# Patient Record
Sex: MALE | Race: WHITE | NOT HISPANIC OR LATINO | Employment: FULL TIME | URBAN - METROPOLITAN AREA
[De-identification: names, ages, dates, MRNs, and addresses within clinical notes are randomized per-mention and may not be internally consistent; named-entity substitution may affect disease eponyms.]

---

## 2023-07-26 LAB
ALANINE AMINOTRANSFERASE (SGPT) (U/L) IN SER/PLAS: 35 U/L (ref 10–52)
ALBUMIN (G/DL) IN SER/PLAS: 4.5 G/DL (ref 3.4–5)
ALKALINE PHOSPHATASE (U/L) IN SER/PLAS: 55 U/L (ref 33–120)
ANION GAP IN SER/PLAS: 9 MMOL/L (ref 10–20)
ASPARTATE AMINOTRANSFERASE (SGOT) (U/L) IN SER/PLAS: 49 U/L (ref 9–39)
BILIRUBIN TOTAL (MG/DL) IN SER/PLAS: 0.7 MG/DL (ref 0–1.2)
C REACTIVE PROTEIN (MG/L) IN SER/PLAS: 0.28 MG/DL
CALCIUM (MG/DL) IN SER/PLAS: 9.1 MG/DL (ref 8.6–10.3)
CARBON DIOXIDE, TOTAL (MMOL/L) IN SER/PLAS: 29 MMOL/L (ref 21–32)
CHLORIDE (MMOL/L) IN SER/PLAS: 105 MMOL/L (ref 98–107)
CREATINE KINASE (U/L) IN SER/PLAS: 313 U/L (ref 0–325)
CREATININE (MG/DL) IN SER/PLAS: 1.37 MG/DL (ref 0.5–1.3)
ERYTHROCYTE DISTRIBUTION WIDTH (RATIO) BY AUTOMATED COUNT: 12.1 % (ref 11.5–14.5)
ERYTHROCYTE MEAN CORPUSCULAR HEMOGLOBIN CONCENTRATION (G/DL) BY AUTOMATED: 34.1 G/DL (ref 32–36)
ERYTHROCYTE MEAN CORPUSCULAR VOLUME (FL) BY AUTOMATED COUNT: 89 FL (ref 80–100)
ERYTHROCYTES (10*6/UL) IN BLOOD BY AUTOMATED COUNT: 5 X10E12/L (ref 4.5–5.9)
GFR MALE: 69 ML/MIN/1.73M2
GLUCOSE (MG/DL) IN SER/PLAS: 78 MG/DL (ref 74–99)
HEMATOCRIT (%) IN BLOOD BY AUTOMATED COUNT: 44.6 % (ref 41–52)
HEMOGLOBIN (G/DL) IN BLOOD: 15.2 G/DL (ref 13.5–17.5)
LEUKOCYTES (10*3/UL) IN BLOOD BY AUTOMATED COUNT: 5.9 X10E9/L (ref 4.4–11.3)
PLATELETS (10*3/UL) IN BLOOD AUTOMATED COUNT: 156 X10E9/L (ref 150–450)
POTASSIUM (MMOL/L) IN SER/PLAS: 3.9 MMOL/L (ref 3.5–5.3)
PROTEIN TOTAL: 7.2 G/DL (ref 6.4–8.2)
SEDIMENTATION RATE, ERYTHROCYTE: <1 MM/H (ref 0–15)
SODIUM (MMOL/L) IN SER/PLAS: 139 MMOL/L (ref 136–145)
TROPONIN I, HIGH SENSITIVITY: 3 NG/L (ref 0–20)
UREA NITROGEN (MG/DL) IN SER/PLAS: 22 MG/DL (ref 6–23)

## 2024-04-03 ENCOUNTER — OFFICE VISIT (OUTPATIENT)
Dept: ORTHOPEDIC SURGERY | Facility: CLINIC | Age: 34
End: 2024-04-03
Payer: COMMERCIAL

## 2024-04-03 ENCOUNTER — HOSPITAL ENCOUNTER (OUTPATIENT)
Dept: RADIOLOGY | Facility: CLINIC | Age: 34
Discharge: HOME | End: 2024-04-03
Payer: COMMERCIAL

## 2024-04-03 DIAGNOSIS — M25.522 LEFT ELBOW PAIN: ICD-10-CM

## 2024-04-03 DIAGNOSIS — M25.512 ACUTE PAIN OF LEFT SHOULDER: ICD-10-CM

## 2024-04-03 DIAGNOSIS — S93.402A MILD ANKLE SPRAIN, LEFT, INITIAL ENCOUNTER: ICD-10-CM

## 2024-04-03 DIAGNOSIS — Z87.828 HISTORY OF ANKLE SPRAIN: ICD-10-CM

## 2024-04-03 DIAGNOSIS — M25.572 ACUTE LEFT ANKLE PAIN: ICD-10-CM

## 2024-04-03 DIAGNOSIS — S90.00XA CONTUSION OF ANKLE, INITIAL ENCOUNTER: ICD-10-CM

## 2024-04-03 DIAGNOSIS — S52.125D NONDISPLACED FRACTURE OF HEAD OF LEFT RADIUS, SUBSEQUENT ENCOUNTER FOR CLOSED FRACTURE WITH ROUTINE HEALING: ICD-10-CM

## 2024-04-03 PROCEDURE — 73080 X-RAY EXAM OF ELBOW: CPT | Mod: LEFT SIDE | Performed by: FAMILY MEDICINE

## 2024-04-03 PROCEDURE — 1036F TOBACCO NON-USER: CPT | Performed by: FAMILY MEDICINE

## 2024-04-03 PROCEDURE — 73030 X-RAY EXAM OF SHOULDER: CPT | Mod: LT

## 2024-04-03 PROCEDURE — 99214 OFFICE O/P EST MOD 30 MIN: CPT | Mod: 57 | Performed by: FAMILY MEDICINE

## 2024-04-03 PROCEDURE — 24650 CLTX RDL HEAD/NCK FX WO MNPJ: CPT | Performed by: FAMILY MEDICINE

## 2024-04-03 PROCEDURE — 73610 X-RAY EXAM OF ANKLE: CPT | Mod: LT

## 2024-04-03 PROCEDURE — 73080 X-RAY EXAM OF ELBOW: CPT | Mod: LT

## 2024-04-03 PROCEDURE — 99204 OFFICE O/P NEW MOD 45 MIN: CPT | Performed by: FAMILY MEDICINE

## 2024-04-03 PROCEDURE — 73610 X-RAY EXAM OF ANKLE: CPT | Mod: LEFT SIDE | Performed by: FAMILY MEDICINE

## 2024-04-03 PROCEDURE — 73030 X-RAY EXAM OF SHOULDER: CPT | Mod: LEFT SIDE | Performed by: FAMILY MEDICINE

## 2024-04-03 RX ORDER — HYDROCODONE BITARTRATE AND ACETAMINOPHEN 5; 325 MG/1; MG/1
1 TABLET ORAL EVERY 12 HOURS PRN
Qty: 14 TABLET | Refills: 0 | Status: SHIPPED | OUTPATIENT
Start: 2024-04-03 | End: 2024-04-10

## 2024-04-03 NOTE — PROGRESS NOTES
Acute Injury New Patient Visit    CC:   Chief Complaint   Patient presents with    Left Ankle - Pain     Lt shoulder Lt ankle pain  Xrays today  Fellbackward playing basketball    Left Shoulder - Pain       HPI: Romulo is a 33 y.o.male who presents today with new complaints of pain discomfort to the left ankle shoulder and elbow.  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  He is a current  and .  He denies any additional injury or concern.  No significant history to the shoulder or elbow in the past he has had several ankle sprains prior.  Feels that the elbow is more painful than anything else today.        Review of Systems   GENERAL: Negative for malaise, significant weight loss, fever  MUSCULOSKELETAL: See HPI  NEURO: Negative for numbness / tingling     Past Medical History  History reviewed. No pertinent past medical history.    Medication review  Medication Documentation Review Audit       Reviewed by Cole C Budinsky, MD (Physician) on 04/03/24 at 1246      Medication Order Taking? Sig Documenting Provider Last Dose Status   HYDROcodone-acetaminophen (Norco) 5-325 mg tablet [id number]  Take 1 tablet by mouth every 12 hours if needed for severe pain (7 - 10) for up to 7 days. Cole C Budinsky, MD  Active                    Allergies  No Known Allergies    Social History  Social History     Socioeconomic History    Marital status:      Spouse name: Not on file    Number of children: Not on file    Years of education: Not on file    Highest education level: Not on file   Occupational History    Not on file   Tobacco Use    Smoking status: Never    Smokeless tobacco: Never   Substance and Sexual Activity    Alcohol use: Not on file    Drug use: Not on file    Sexual activity: Not on file   Other Topics Concern    Not on file   Social History Narrative    Not on file     Social Determinants of Health     Financial Resource Strain: Not on file   Food  Insecurity: Not on file   Transportation Needs: Not on file   Physical Activity: Not on file   Stress: Not on file   Social Connections: Not on file   Intimate Partner Violence: Not on file   Housing Stability: Not on file       Surgical History  History reviewed. No pertinent surgical history.    Physical Exam:  GENERAL:  Patient is awake, alert, and oriented to person place and time.  Patient appears well nourished and well kept.  Affect Calm, Not Acutely Distressed.  HEENT:  Normocephalic, Atraumatic, EOMI  CARDIOVASCULAR:  Hemodynamically stable.  RESPIRATORY:  Normal respirations with unlabored breathing.  NEURO: The bilateral upper and lower extremities were examined and demonstrated intact sensation medially and laterally.  Extremity: Left ankle exam demonstrates mild soft tissue swelling is tenderness palpation over the distal fibula ATFL and CFL ligament.  He has full plantarflexion dorsiflexion eversion inversion midfoot distal metatarsals nontender calf soft nontender no medial malleoli or pain negative heel tap calcaneal squeeze mild antalgic gait noted.  Left elbow demonstrates tenderness palpation over the lateral aspect of the forearm proximally at the level of the radial head he has limited pronation and supination secondary to pain he lacks terminal extension of the elbow by 5 to 10 degrees flexion is limited to 95 degrees.  Left shoulder has mild soft tissue tenderness with otherwise full range of motion and normal strength about the shoulder.  There is no crepitus or clicking.      Diagnostics: X-rays today positive for nondisplaced fracture of the left radial head        Procedure: None  Procedures    Assessment:   Problem List Items Addressed This Visit    None  Visit Diagnoses       Acute left ankle pain        Relevant Medications    HYDROcodone-acetaminophen (Norco) 5-325 mg tablet    Other Relevant Orders    XR ankle left 3+ views    XR shoulder left 2+ views    Sling    Acute pain of left  shoulder        Relevant Medications    HYDROcodone-acetaminophen (Norco) 5-325 mg tablet    Other Relevant Orders    XR ankle left 3+ views    XR shoulder left 2+ views    Sling    History of ankle sprain        Relevant Medications    HYDROcodone-acetaminophen (Norco) 5-325 mg tablet    Other Relevant Orders    Ankle Brace, Lace Up or A60    Sling    Contusion of ankle, initial encounter        Relevant Medications    HYDROcodone-acetaminophen (Norco) 5-325 mg tablet    Other Relevant Orders    Ankle Brace, Lace Up or A60    Sling    Left elbow pain        Relevant Medications    HYDROcodone-acetaminophen (Norco) 5-325 mg tablet    Other Relevant Orders    XR elbow left 3+ views    Sling    Nondisplaced fracture of head of left radius, subsequent encounter for closed fracture with routine healing        Relevant Medications    HYDROcodone-acetaminophen (Norco) 5-325 mg tablet    Other Relevant Orders    Sling    Mild ankle sprain, left, initial encounter        Relevant Medications    HYDROcodone-acetaminophen (Norco) 5-325 mg tablet    Other Relevant Orders    Sling             Plan: Discussed the nonoperative nature of these injuries with the patient here today.  He will be provided with a short course of pain medication for symptomatic relief in the evenings.  He was given a sling and recommended to come out of sling for gentle range of motion and strength recovery over the next week or 2.  Discussed the ability to provide him with a lace up ankle brace for the ankle sprain and to provide him with a short course of home exercises.  If the brace is not enough support he can call or return for a boot.  Regarding the left shoulder no obvious significant injury is appreciated on exam today.  Will keep a close eye on the shoulder.  Will repeat x-rays 3 views left elbow in 4 weeks for repeat evaluation if the ankle is still uncomfortable we will repeat x-rays as well there.    Orders Placed This Encounter    Ankle  Brace, Lace Up or A60    Sling    XR ankle left 3+ views    XR shoulder left 2+ views    XR elbow left 3+ views    HYDROcodone-acetaminophen (Norco) 5-325 mg tablet      At the conclusion of the visit there were no further questions by the patient/family regarding their plan of care.  Patient was instructed to call or return with any issues, questions, or concerns regarding their injury and/or treatment plan described above.     04/03/24 at 12:51 PM - Cole C Budinsky, MD    Office: [phone]    This note was prepared using voice recognition software.  The details of this note are correct and have been reviewed, and corrected to the best of my ability.  Some grammatical errors may persist related to the Dragon software.

## 2024-05-01 ENCOUNTER — APPOINTMENT (OUTPATIENT)
Dept: ORTHOPEDIC SURGERY | Facility: CLINIC | Age: 34
End: 2024-05-01
Payer: COMMERCIAL

## 2024-05-09 ENCOUNTER — HOSPITAL ENCOUNTER (OUTPATIENT)
Dept: RADIOLOGY | Facility: HOSPITAL | Age: 34
Discharge: HOME | End: 2024-05-09
Payer: COMMERCIAL

## 2024-05-09 ENCOUNTER — OFFICE VISIT (OUTPATIENT)
Dept: ORTHOPEDIC SURGERY | Facility: CLINIC | Age: 34
End: 2024-05-09
Payer: COMMERCIAL

## 2024-05-09 DIAGNOSIS — S52.125D NONDISPLACED FRACTURE OF HEAD OF LEFT RADIUS, SUBSEQUENT ENCOUNTER FOR CLOSED FRACTURE WITH ROUTINE HEALING: ICD-10-CM

## 2024-05-09 DIAGNOSIS — S93.402D SPRAIN OF LEFT ANKLE, UNSPECIFIED LIGAMENT, SUBSEQUENT ENCOUNTER: Primary | ICD-10-CM

## 2024-05-09 DIAGNOSIS — M25.572 ACUTE LEFT ANKLE PAIN: ICD-10-CM

## 2024-05-09 PROCEDURE — 73080 X-RAY EXAM OF ELBOW: CPT | Mod: LT

## 2024-05-09 PROCEDURE — 73610 X-RAY EXAM OF ANKLE: CPT | Mod: LEFT SIDE | Performed by: RADIOLOGY

## 2024-05-09 PROCEDURE — 73080 X-RAY EXAM OF ELBOW: CPT | Mod: LEFT SIDE | Performed by: RADIOLOGY

## 2024-05-09 PROCEDURE — 73610 X-RAY EXAM OF ANKLE: CPT | Mod: LT

## 2024-05-09 PROCEDURE — 1036F TOBACCO NON-USER: CPT | Performed by: FAMILY MEDICINE

## 2024-05-09 PROCEDURE — 99024 POSTOP FOLLOW-UP VISIT: CPT | Performed by: FAMILY MEDICINE

## 2024-05-09 NOTE — PROGRESS NOTES
Established Patient Follow-Up Visit    CC: No chief complaint on file.      HPI:  Romulo is a 33 y.o. male returns here today for follow-up visit regarding: Left ankle sprain, left radial head fracture.  He states only a little bit of swelling but no pain or discomfort to the left ankle.  He has a little bit of discomfort to the left elbow only when using full weightbearing to push off but seated position.  He has no pain or discomfort with normal range of motion activities otherwise.          REVIEW OF SYSTEMS:  GENERAL: Negative for malaise, significant weight loss, fever  MUSCULOSKELETAL: See HPI  NEURO: Negative for numbness / tingling       PHYSICAL EXAM:  -Neuro: The bilateral upper and lower extremities were examined and demonstrated intact sensation medially and laterally.  -Extremity: Left elbow exam demonstrates no tenderness palpation over the radial head no crepitus no flexion extension deficits.  Full range of motion and strength otherwise forearm compartment soft compressible.  Normal wrist flexion wrist extension strength as well.    Left ankle exam: On inspection minimal soft tissue swelling some fullness anteriorly mild discomfort over the ATFL and CFL ligament no bony tenderness to the medial or lateral malleolus negative heel tap calcaneal squeeze midfoot distal metatarsals nontender.  No laxity with anterior drawer.  Full plantarflexion dorsiflexion eversion inversion.  Patient is able to stand place full weightbearing walk around the room all without pain.    IMAGING: Imaging of the elbow and ankle both on the left side were interpreted and evaluated by me personally here today.  Repeat x-rays of the elbow today demonstrate stable nondisplaced left proximal radial neck fracture with interval sclerotic callus ration.  No presence for fracture dislocation to the left ankle  PROCEDURE: None  Procedures     ASSESSMENT:   Follow-up visit for:  Problem List Items Addressed This Visit    None  Visit  Diagnoses       Sprain of left ankle, unspecified ligament, subsequent encounter    -  Primary    Nondisplaced fracture of head of left radius, subsequent encounter for closed fracture with routine healing        Relevant Orders    XR elbow left 3+ views    Acute left ankle pain        Relevant Orders    XR ankle left 3+ views             PLAN: At this time we will offer the patient as needed follow-up.  With respect to the left elbow we will have him increase all activities as tolerated gently increasing weightbearing tolerance as able.  Should there be any issues or concerns he can call or return regarding his left elbow.  Additionally he may continue to wean from the lace up ankle brace only utilizing it with a high risk activities going forward for the next few weeks.  Regarding some of the soft tissue swelling which is not very painful for her and recommended compression socks or a simple compression ankle sleeve.  Also provided him with home exercises for range of motion strength and proprioceptive recovery.  Should there be any persistent issues or recurrent injury to the left ankle I am happy to see him back down the road.  Patient denied any further additional issues or concerns and was agreeable to the plan as above.  Orders Placed This Encounter    XR elbow left 3+ views    XR ankle left 3+ views           At the conclusion of the visit there were no further questions by the patient/family regarding their plan of care.  Patient was instructed to call or return with any issues, questions, or concerns regarding their injury and/or treatment plan described above.     05/09/24 at 4:07 PM - Cole C Budinsky, MD    Office: [phone]    This note was prepared using voice recognition software.  The details of this note are correct and have been reviewed, and corrected to the best of my ability.  Some grammatical errors may persist related to the Dragon software.

## 2024-08-14 ENCOUNTER — OFFICE VISIT (OUTPATIENT)
Dept: ORTHOPEDIC SURGERY | Facility: CLINIC | Age: 34
End: 2024-08-14
Payer: COMMERCIAL

## 2024-08-14 ENCOUNTER — HOSPITAL ENCOUNTER (OUTPATIENT)
Dept: RADIOLOGY | Facility: HOSPITAL | Age: 34
Discharge: HOME | End: 2024-08-14
Payer: COMMERCIAL

## 2024-08-14 DIAGNOSIS — M54.50 ACUTE LOW BACK PAIN, UNSPECIFIED BACK PAIN LATERALITY, UNSPECIFIED WHETHER SCIATICA PRESENT: ICD-10-CM

## 2024-08-14 DIAGNOSIS — S39.012A LUMBAR STRAIN, INITIAL ENCOUNTER: Primary | ICD-10-CM

## 2024-08-14 DIAGNOSIS — M67.952 TENDINOPATHY OF LEFT GLUTEUS MEDIUS: ICD-10-CM

## 2024-08-14 PROCEDURE — 72110 X-RAY EXAM L-2 SPINE 4/>VWS: CPT

## 2024-08-14 PROCEDURE — 99214 OFFICE O/P EST MOD 30 MIN: CPT | Performed by: STUDENT IN AN ORGANIZED HEALTH CARE EDUCATION/TRAINING PROGRAM

## 2024-08-14 RX ORDER — CYCLOBENZAPRINE HCL 10 MG
10 TABLET ORAL NIGHTLY PRN
Qty: 7 TABLET | Refills: 0 | Status: SHIPPED | OUTPATIENT
Start: 2024-08-14 | End: 2024-08-21

## 2024-08-14 RX ORDER — METHYLPREDNISOLONE 4 MG/1
TABLET ORAL
Qty: 1 EACH | Refills: 0 | Status: SHIPPED | OUTPATIENT
Start: 2024-08-14

## 2024-08-14 NOTE — PROGRESS NOTES
Acute Injury Established Patient Visit    HPI: Romulo is a 33 y.o.male who presents today with new complaints of low back pain.  He states that on Saturday he was trying to lift the garage door as the spring was broken, and he states that he felt immediate pain into his left lower back.  He states that it radiates to the lateral hip.  He denies any numbness and tingling.  He denies any bowel bladder changes, saddle anesthesia, no weakness in the legs.  States that the pain is throbbing and sharp.  He is having worsening pain with bending forward and sitting for long periods of time.  Has been using ibuprofen, muscle relaxer, ice, and saw the chiropractor 4 times.  He is not getting better.    Plan: For this left lumbar strain and left gluteus medius tendinopathy, we will start a Medrol Dosepak, him on cyclobenzaprine, have him start physical therapy and home exercises, continue other conservative treatment measures as discussed including ice, heat, stretches, and rest, and follow-up in 5 to 6 weeks.    Assessment:   Problem List Items Addressed This Visit    None  Visit Diagnoses       Lumbar strain, initial encounter    -  Primary    Acute low back pain, unspecified back pain laterality, unspecified whether sciatica present        Relevant Orders    XR lumbar spine complete 4+ views    Tendinopathy of left gluteus medius                Diagnostics: X-rays of the lumbar spine reviewed and reveal no obvious acute fracture or subluxation.      Procedure:  Procedures    Physical Exam:  GENERAL:  No obvious acute distress.  NEURO:  Distally neurovascularly intact.  Sensation intact to light touch.  Extremity: Exam of the lumbar spine:  No tenderness along the midline of the lumbar and sacral spine;  No step-offs along the midline of the lumbar and sacral spine;  Flexion is limited with pain;  Extension is limited with limited with pain;  Sidebending is limited with pain;  Rotation is limited with pain;  POSITIVE  straight leg raise on the right, localizing to the low back on the right;  Negative straight leg raise on the left;  Full strength and range of motion throughout the bilateral lower extremities; and  Moderately antalgic gait.    Orders Placed This Encounter    XR lumbar spine complete 4+ views      At the conclusion of the visit there were no further questions by the patient/family regarding their plan of care.  Patient was instructed to call or return with any issues, questions, or concerns regarding their injury and/or treatment plan described above.     08/14/24 at 4:01 PM - Abdelrahman Aguilera,     Office: [phone]    This note was prepared using voice recognition software.  The details of this note are correct and have been reviewed, and corrected to the best of my ability.  Some grammatical errors may persist related to the Dragon software.

## 2024-08-27 NOTE — PROGRESS NOTES
"Physical Therapy    Physical Therapy Evaluation and Treatment      Patient Name: Romulo Sapp  MRN: [id number]  Today's Date: 8/28/2024    Current Problem:   1. Lumbar spine strain, subsequent encounter  Referral to Physical Therapy      2. Lumbago  Referral to Physical Therapy        Insurance: M[employer]  Allowed visits: 10    Subjective  HPI: Patient has a manual garage door on his shed that jammed when his son jumped on it. When he attempted to open the door \"my arm went up and my back did not\" and the garage did not open. This occurred 3 weeks ago. He was prescribed a steroid taper and a muscle relaxer which provided some relief but he feels as though pain has worsened again. He has tried use of ice and Ibuprofen with minimal relief. He was prescribed light stretches by Dr. Aguilera for his hamstring and his piriformis which help but also sometimes cause pain even when he performs them contralaterally. Chief complaint currently is \"sleeping at night\". He has pain with lifting and \"I can't do much with physical activity\". He has had back pain that resolved in the past and was beginning to be more active with his wife doing yoga prior to recent injury. Biggest limitation currently is \"I can't even mow my lawn\" as he has pain with walking/pushing his mower. He has some relief with sitting down but then notes \"it feels better to walk around too\". Exacerbating factors include \"walking for too long, sitting for too long\". He generally sleeps on his side and prefers his left side which is uncomfortable at this time. All of his pain is left sided from his low back into his buttocks. He denies paresthesias into either LE. He denies an increase in symptoms with Valsalva. Relieving factors include \"light stretching\". Stretching on the right sometimes causes pain on his left. He denies any change in bowel or bladder habits, denies recent weight loss. He was 100% functional prior to injury. PMH is positive for LBP, bilateral " "compartment syndrome and fasciotomies.   Referring physician: Abdelrahman Aguilera DO - F/U 9/27/24  PCP: Sampson Sandhu DO    Living environment: multi-story home; 15 steps. No BON. Lives with wife and children.   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     Patient-specific goal: \"Basically just to strengthen everything back up\".     Objective  Worst pain in the last 24 hours, 6/10    Precautions: none    Relevant imaging/diagnostic testing results: x-rays of lumbar spine unremarkable. 12 mm calcific density projecting in the right hemiabdomen in approximate anatomic location of the right kidney which may represent   intra-abdominal calcification or intraluminal calcification within bowel with nephrolith not excluded.    Observation: increased lumbar lordosis evident.     Gait Assessment: ambulated into clinic with somewhat guarded posture; slight difficulty with sit to stand, slight Sukh's sign.     AROM  Lumbar flexion: decreased 50%; minimal reversal of lordosis  RFIS: no change in pain with 10 reps. DKTC \"tight\"  Lumbar extension: decreased 50%; hinges from L4-5 P!   GLORIA: worse pain with 10 reps  Lumbar sidebending right: decreased 25%    Sidebending left: decreased 50%; minimal movement at low lumbar P!  Lumbar rotation right: decreased 25%    Rotation left: decreased 50%   Hip flexion right: WNL    Hip flexion left: WNL P!   Hip extension right: slightly limited    Hip extension left: slightly limited  Hip ER supine right: WNL    Hip ER supine left: WNL P!  Hip IR supine right: WNL    Hip IR supine left: WNL    MMT:  (L3-L4) Right quadriceps: 5    Left quadriceps: 5 P!   (L1-L2) Right iliopsoas: 5    Left iliopsoas: 5  (S2) Right hip abductors supine: fair    Left hip abductors supine: fair  (L1-L2) Right hip adductors supine: good    Left hip adductors supine: good  (L5-S1) Right gluteus leelee: 5    Left gluteus leelee: 5  (S1-S2) Right hamstrings: 5    Left " "hamstrings: 5  Lower abdominals: unable to achieve test position P!   Upper abdominals: 5/10 P!     Flexibility:  Right hamstring: slightly limited   Left hamstring: limited  Right piriformis: TBA   Left piriformis: TBA  Right quadriceps: slightly limited    Left quadriceps: slightly limited    Mobility: decreased throughout lumbar spine; pain elicited with PA glides at left side of L3-L5.     Special Tests: slump positive  Crossed SLR negative    MARCIN: 58%    Assessment  34 yo male with 3 week history of present condition and presence of3 personal factors and/or comorbidities that impact the plan of care including PMH of LBP and bilateral compartment syndrome with fasciotomies presents with low back pain, decreased AROM, decreased strength, and decreased tolerance to IADLs consistent with lumbar sprain/strain effecting the following body structures and functions: low back body region and musculoskeletal body system including the lumbar spine. Activity limitations and participation restrictions include decreased tolerance to lifting, sleeping, and participating in wellness activities. Romulo will therefore benefit from PT management to establish a HEP and promote pain management and improved mobility. The clinical presentation of this patient is evolving and their history and examination findings are consistent with a moderate complexity evaluation. Good potential.    Treatment provided today: Initial evaluation completed. Discussed objective findings and goals of skilled care. Dry needling performed this date using 2 50 mm/2\" needles to L5 homeostatic points and 4 75 mm/3\" needles to superior cluneal and inferior gluteal homeostatic points. Informed consent obtained today and is on file in patient's chart. Cushing precautions and clean technique utilized throughout. Skin was inspected for any adverse reactions and none were present. Patient was educated to avoid NSAIDs and use of ice for the next 24 hours to " "allow for maximum healing potential. Instructed patient in therapeutic exercise and HEP with handout outlining specific parameters provided (LTR x20, TAC 5\"x10, DKTC 20\"x3). Agreed upon POC and answered all questions.    20130 - 20 minutes, 1 unit untimed  36250 - 15 minutes, 1 unit  79398 - 15 minutes, 1 unit    Plan  Recommending PT management 1x/week for 4 weeks, 4 visits.     Goals  Independent with HEP to expedite progress and promote goal achievement.   Decrease MARCIN score to < or equal to 20% for evidence of improved function.  Increase AROM lumbar spine flexion, extension, left SB, and left rotation by > or equal to 25% for improved ability to reach and bend.   Increase strength upper and lower abdominals to > or equal to 8/10 and bilateral hip abductors to 'good' for evidence of normal strength and tolerance to prolonged positions.  Decrease pain at worst with activity to < or equal to 2/10 for improved QOL and confidence in functional ability.     "

## 2024-08-28 ENCOUNTER — APPOINTMENT (OUTPATIENT)
Dept: PHYSICAL THERAPY | Facility: CLINIC | Age: 34
End: 2024-08-28
Payer: COMMERCIAL

## 2024-08-28 DIAGNOSIS — S39.012D LUMBAR SPINE STRAIN, SUBSEQUENT ENCOUNTER: Primary | ICD-10-CM

## 2024-08-28 DIAGNOSIS — M54.50 LUMBAGO: ICD-10-CM

## 2024-08-28 PROCEDURE — 97110 THERAPEUTIC EXERCISES: CPT | Performed by: PHYSICAL THERAPIST

## 2024-08-28 PROCEDURE — 97162 PT EVAL MOD COMPLEX 30 MIN: CPT | Performed by: PHYSICAL THERAPIST

## 2024-08-28 PROCEDURE — 97140 MANUAL THERAPY 1/> REGIONS: CPT | Performed by: PHYSICAL THERAPIST

## 2024-08-28 ASSESSMENT — PATIENT HEALTH QUESTIONNAIRE - PHQ9
1. LITTLE INTEREST OR PLEASURE IN DOING THINGS: NOT AT ALL
2. FEELING DOWN, DEPRESSED OR HOPELESS: NOT AT ALL
SUM OF ALL RESPONSES TO PHQ9 QUESTIONS 1 AND 2: 0

## 2024-08-28 ASSESSMENT — PAIN DESCRIPTION - DESCRIPTORS: DESCRIPTORS: TIGHTNESS;SHARP

## 2024-08-28 ASSESSMENT — PAIN SCALES - GENERAL: PAINLEVEL_OUTOF10: 6

## 2024-09-06 ENCOUNTER — APPOINTMENT (OUTPATIENT)
Dept: PRIMARY CARE | Facility: CLINIC | Age: 34
End: 2024-09-06
Payer: COMMERCIAL

## 2024-09-06 VITALS
SYSTOLIC BLOOD PRESSURE: 120 MMHG | WEIGHT: 228 LBS | HEIGHT: 75 IN | HEART RATE: 74 BPM | TEMPERATURE: 98.4 F | OXYGEN SATURATION: 96 % | DIASTOLIC BLOOD PRESSURE: 80 MMHG | BODY MASS INDEX: 28.35 KG/M2

## 2024-09-06 DIAGNOSIS — F90.9 ATTENTION DEFICIT HYPERACTIVITY DISORDER (ADHD), UNSPECIFIED ADHD TYPE: ICD-10-CM

## 2024-09-06 DIAGNOSIS — K21.9 GASTROESOPHAGEAL REFLUX DISEASE WITHOUT ESOPHAGITIS: ICD-10-CM

## 2024-09-06 DIAGNOSIS — Z76.89 ENCOUNTER TO ESTABLISH CARE: Primary | ICD-10-CM

## 2024-09-06 DIAGNOSIS — R93.5 ABNORMAL ABDOMINAL X-RAY: ICD-10-CM

## 2024-09-06 DIAGNOSIS — G47.00 INSOMNIA, UNSPECIFIED TYPE: ICD-10-CM

## 2024-09-06 RX ORDER — DEXTROAMPHETAMINE SACCHARATE, AMPHETAMINE ASPARTATE MONOHYDRATE, DEXTROAMPHETAMINE SULFATE AND AMPHETAMINE SULFATE 7.5; 7.5; 7.5; 7.5 MG/1; MG/1; MG/1; MG/1
30 CAPSULE, EXTENDED RELEASE ORAL
COMMUNITY
Start: 2024-09-03

## 2024-09-06 RX ORDER — DEXTROAMPHETAMINE SACCHARATE, AMPHETAMINE ASPARTATE, DEXTROAMPHETAMINE SULFATE AND AMPHETAMINE SULFATE 2.5; 2.5; 2.5; 2.5 MG/1; MG/1; MG/1; MG/1
10 TABLET ORAL DAILY
COMMUNITY
Start: 2024-07-08

## 2024-09-06 RX ORDER — EPINEPHRINE 0.3 MG/.3ML
1 INJECTION SUBCUTANEOUS ONCE AS NEEDED
COMMUNITY

## 2024-09-06 ASSESSMENT — ENCOUNTER SYMPTOMS
ARTHRALGIAS: 0
NERVOUS/ANXIOUS: 0
AGITATION: 0
HEADACHES: 0
DYSURIA: 0
MYALGIAS: 0
CONSTIPATION: 0
SORE THROAT: 0
PALPITATIONS: 0
ABDOMINAL PAIN: 0
NAUSEA: 0
WHEEZING: 0
HEMATURIA: 0
JOINT SWELLING: 0
FLANK PAIN: 0
DEPRESSION: 0
UNEXPECTED WEIGHT CHANGE: 0
DIARRHEA: 0
SHORTNESS OF BREATH: 0
LIGHT-HEADEDNESS: 0
FATIGUE: 0
FEVER: 0
COUGH: 0

## 2024-09-06 NOTE — PROGRESS NOTES
Subjective   Chief complaint: Romulo Sapp is a 34 y.o. male who presents for New Patient Visit (New patient establish PCP. ).    HPI:  HPI  To establish.  He was having back pain.  Was seen by orthopedics.  Performed x-ray.  Back x-ray revealed an abnormal 12 mm calcific object right hemiabdomen.  Will proceed with CT scan abdomen pelvis for evaluation.  No hematuria.  No dysuria.  No kidney stone flank pain.  No family history of kidney stones.  He was having gas or esophageal flux disease.  Underwent EGD.  Unremarkable.  Had a colonoscopy at that time.  They found a polyp.  Recommend a 5-year follow-up.  Otherwise he states he recently had labs through his work for employment physical.  He is can provide those results.  Previous primary care physician notes are reviewed.  He has been seen by rheumatology.  Positive positive NITZA but workup subsequent was unremarkable.  He was also evaluated by cardiology.  He does have a history of ADHD and insomnia.  He is followed by a provider.  For now physical examination is unremarkable.  Regular follow-up pending clinical course and test results  Past Medical History:   Diagnosis Date    ADHD (attention deficit hyperactivity disorder)     History reviewed. No pertinent surgical history.   No family history on file.  Social History     Socioeconomic History    Marital status:      Spouse name: Not on file    Number of children: Not on file    Years of education: Not on file    Highest education level: Not on file   Occupational History    Not on file   Tobacco Use    Smoking status: Never     Passive exposure: Never    Smokeless tobacco: Never   Vaping Use    Vaping status: Never Used   Substance and Sexual Activity    Alcohol use: Yes     Alcohol/week: 4.0 standard drinks of alcohol     Types: 4 Standard drinks or equivalent per week     Comment: 3-5 drinks weekly    Drug use: Never    Sexual activity: Not on file   Other Topics Concern    Not on file   Social  History Narrative    Not on file     Social Determinants of Health     Financial Resource Strain: High Risk (12/28/2022)    Received from [facility]    Overall Financial Resource Strain (CARDIA)     Difficulty of Paying Living Expenses: Hard   Food Insecurity: No Food Insecurity (12/28/2022)    Received from [facility]    Hunger Vital Sign     Worried About Running Out of Food in the Last Year: Never true     Ran Out of Food in the Last Year: Never true   Transportation Needs: No Transportation Needs (12/28/2022)    Received from [facility]    PRAPARE - Transportation     Lack of Transportation (Medical): No     Lack of Transportation (Non-Medical): No   Physical Activity: Insufficiently Active (12/28/2022)    Received from [facility]    Exercise Vital Sign     Days of Exercise per Week: 4 days     Minutes of Exercise per Session: 20 min   Stress: No Stress Concern Present (12/28/2022)    Received from [facility]    [place] [employer] - Occupational Stress Questionnaire     Feeling of Stress : Not at all   Social Connections: Socially Integrated (12/28/2022)    Received from [facility]    Social Connection and Isolation Panel [NHANES]     Frequency of Communication with Friends and Family: More than three times a week     Frequency of Social Gatherings with Friends and Family: Three times a week     Attends [employer] Services: More than 4 times per year     Active Member of Clubs or Organizations: Yes     Attends Club or Organization Meetings: 1 to 4 times per year     Marital Status:    Intimate Partner Violence: Not on file   Housing Stability: Low Risk  (12/28/2022)    Received from [facility]    Housing Stability Vital Sign     Unable to Pay for Housing in the Last Year: No     Number of Places Lived in the Last Year:  "1     Unstable Housing in the Last Year: No       Objective   /80 (BP Location: Left arm, Patient Position: Sitting, BP Cuff Size: Adult)   Pulse 74   Temp 36.9 °C (98.4 °F) (Temporal)   Ht 1.905 m (6' 3\")   Wt 103 kg (228 lb)   SpO2 96%   BMI 28.50 kg/m²   Physical Exam  Vitals and nursing note reviewed.   Constitutional:       General: He is not in acute distress.     Appearance: He is not ill-appearing or toxic-appearing.   HENT:      Head: Normocephalic and atraumatic.      Mouth/Throat:      Pharynx: No oropharyngeal exudate or posterior oropharyngeal erythema.   Eyes:      Extraocular Movements: Extraocular movements intact.      Conjunctiva/sclera: Conjunctivae normal.      Pupils: Pupils are equal, round, and reactive to light.   Cardiovascular:      Rate and Rhythm: Normal rate and regular rhythm.      Pulses: Normal pulses.      Heart sounds: Normal heart sounds. No murmur heard.  Pulmonary:      Effort: Pulmonary effort is normal.      Breath sounds: Normal breath sounds. No wheezing, rhonchi or rales.   Abdominal:      General: Abdomen is flat. Bowel sounds are normal. There is no distension.      Palpations: Abdomen is soft. There is no mass.      Tenderness: There is no abdominal tenderness.      Hernia: No hernia is present.   Musculoskeletal:         General: No swelling or deformity. Normal range of motion.      Cervical back: Normal range of motion and neck supple.   Skin:     General: Skin is warm and dry.      Capillary Refill: Capillary refill takes less than 2 seconds.      Coloration: Skin is not jaundiced.      Findings: No erythema or rash.   Neurological:      General: No focal deficit present.      Mental Status: He is oriented to person, place, and time. Mental status is at baseline.   Psychiatric:         Mood and Affect: Mood normal.         Behavior: Behavior normal.         Thought Content: Thought content normal.         Judgment: Judgment normal.       Review of Systems "   Constitutional:  Negative for fatigue, fever and unexpected weight change.   HENT:  Negative for congestion and sore throat.    Respiratory:  Negative for cough, shortness of breath and wheezing.    Cardiovascular:  Negative for chest pain, palpitations and leg swelling.   Gastrointestinal:  Negative for abdominal pain, constipation, diarrhea and nausea.   Genitourinary:  Negative for dysuria, flank pain and hematuria.   Musculoskeletal:  Negative for arthralgias, joint swelling and myalgias.   Skin:  Negative for rash.   Neurological:  Negative for syncope, light-headedness and headaches.   Psychiatric/Behavioral:  Negative for agitation. The patient is not nervous/anxious.       I have reviewed and reconciled the medication list with the patient today.   Current Outpatient Medications:     amphetamine-dextroamphetamine (Adderall) 10 mg tablet, Take 1 tablet (10 mg) by mouth once daily., Disp: , Rfl:     amphetamine-dextroamphetamine XR (Adderall XR) 30 mg 24 hr capsule, Take 1 capsule (30 mg) by mouth once daily in the morning. Take before meals., Disp: , Rfl:     cyclobenzaprine (Flexeril) 10 mg tablet, Take 1 tablet (10 mg) by mouth as needed at bedtime for muscle spasms for up to 7 days., Disp: 7 tablet, Rfl: 0    EPINEPHrine 0.3 mg/0.3 mL injection syringe, Inject 0.3 mL (0.3 mg) into the muscle 1 time if needed for anaphylaxis., Disp: , Rfl:     methylPREDNISolone (Medrol Dospak) 4 mg tablets, Follow schedule on package instructions, Disp: 1 each, Rfl: 0     Imaging:  XR lumbar spine complete 4+ views    Result Date: 8/17/2024  Interpreted By:  Antonina Pacheco, STUDY: Lumbar Spine, 4 views.   INDICATION: Signs/Symptoms:pain.   COMPARISON: None.   ACCESSION NUMBER(S): [id number]   ORDERING CLINICIAN: SENDY MELENDEZ   FINDINGS: Alignment is within normal limits. Disc heights are well preserved. 12 mm calcific density projecting in the right hemiabdomen in approximate anatomic location of the right kidney  which may represent intra-abdominal calcification or intraluminal calcification within bowel with nephrolith not excluded.   No dynamic instability on flexion/extension views. Vertebral body heights are preserved. Posterior elements are intact.       1. Unremarkable lumbar spine radiographs. 2. 12 mm calcific density projecting in the right hemiabdomen in approximate anatomic location of the right kidney which may represent intra-abdominal calcification or intraluminal calcification within bowel with nephrolith not excluded   MACRO: None.   Signed by: Antonina Pacheco 8/17/2024 12:01 PM Dictation workstation:   [id number]       Labs reviewed:    Lab Results   Component Value Date    WBC 5.9 07/26/2023    HGB 15.2 07/26/2023    HCT 44.6 07/26/2023     07/26/2023    ALT 35 07/26/2023    AST 49 (H) 07/26/2023     07/26/2023    K 3.9 07/26/2023     07/26/2023    CREATININE 1.37 (H) 07/26/2023    BUN 22 07/26/2023    CO2 29 07/26/2023       Assessment/Plan   Problem List Items Addressed This Visit    None  Visit Diagnoses         Codes    Encounter to establish care    -  Primary Z76.89    Gastroesophageal reflux disease without esophagitis     K21.9    Attention deficit hyperactivity disorder (ADHD), unspecified ADHD type     F90.9    Insomnia, unspecified type     G47.00    Abnormal abdominal x-ray     R93.5    Relevant Orders    CT abdomen pelvis w and wo IV contrast            Reinforced lifestyle modifications  Continue current medications as listed  Physical activity as tolerated and healthy diet encouraged  Maintain a healthy weight  Follow up in

## 2024-09-11 ENCOUNTER — APPOINTMENT (OUTPATIENT)
Dept: PHYSICAL THERAPY | Facility: CLINIC | Age: 34
End: 2024-09-11
Payer: COMMERCIAL

## 2024-09-16 ENCOUNTER — APPOINTMENT (OUTPATIENT)
Dept: PHYSICAL THERAPY | Facility: CLINIC | Age: 34
End: 2024-09-16
Payer: COMMERCIAL

## 2024-09-16 DIAGNOSIS — S39.012D LUMBAR SPINE STRAIN, SUBSEQUENT ENCOUNTER: Primary | ICD-10-CM

## 2024-09-16 DIAGNOSIS — M54.50 LUMBAGO: ICD-10-CM

## 2024-09-16 PROCEDURE — 97140 MANUAL THERAPY 1/> REGIONS: CPT | Performed by: PHYSICAL THERAPIST

## 2024-09-16 PROCEDURE — 97110 THERAPEUTIC EXERCISES: CPT | Performed by: PHYSICAL THERAPIST

## 2024-09-16 ASSESSMENT — PAIN SCALES - GENERAL: PAINLEVEL_OUTOF10: 6

## 2024-09-16 NOTE — PROGRESS NOTES
"Physical Therapy    Physical Therapy Treatment    Patient Name: Romulo Sapp  MRN: [id number]  Today's Date: 9/16/2024    Current Problem  1. Lumbar spine strain, subsequent encounter        2. Lumbago          General  Reason for Referral: (P) Low back pain  Referred By: RodneyP) Abdelrahman Aguilera DO    Insurance: MMO  Allowed visits: 10    Subjective  Patient with improvement in his back but has noticed \"I'm dragging my left foot behind me\" like his LE is weak. He denies paresthesias into his left LE. He is able to more around his home but notes \"I'm not as active as I'd like to be\". He describes pain as constant. He is sleeping better.     Objective  Reviewed and progressed marked therapeutic exercise per patient tolerance (33385 - 24 minutes, 2 units) *Child's pose 20\"x5  LTR x20  DKTC 20\"x3  TAC 5\"x15  *Hooklying clams BuTB 2x10  *SKTC isometrics 5\"x10 ea  *Seated lumbar flexion 20\"x3    *added to HEP    MT (51124 - 16 minutes, 1 units) Dry needling performed this date using 4 2\"/50 mm needles to L2 and L5 paraspinal points as well as 4 3\"/75 mm needles to inferior gluteal homeostatic points and superior cluneals; 8 needles used in total. Informed consent obtained at  and is on file in patient's chart. [place] precautions and clean technique utilized throughout. Skin was inspected for any adverse reactions and none were present. Patient was educated to avoid NSAIDs and use of ice for the next 24 hours to allow for maximum healing potential.   Grade III PA glides to L3-L5 for mobility and pain modulation; unilateral glides to right side of lumbar spine for left rotation mobility per patient tolerance.     Assessment  Patient tolerated workout well with improvement in lumbar rotation ROM evident. Seemed to tolerate position changes with increased ease. Difficulty setting right L5 needle with bony feel occurring each time; no resistance with left L5. Some decrease in pain reported following session. "     Plan  Continue per POC at this time.

## 2024-09-17 ENCOUNTER — HOSPITAL ENCOUNTER (OUTPATIENT)
Dept: RADIOLOGY | Facility: CLINIC | Age: 34
Discharge: HOME | End: 2024-09-17
Payer: COMMERCIAL

## 2024-09-17 DIAGNOSIS — R93.5 ABNORMAL ABDOMINAL X-RAY: ICD-10-CM

## 2024-09-17 PROCEDURE — 74176 CT ABD & PELVIS W/O CONTRAST: CPT | Performed by: RADIOLOGY

## 2024-09-17 PROCEDURE — 74176 CT ABD & PELVIS W/O CONTRAST: CPT

## 2024-09-23 ENCOUNTER — APPOINTMENT (OUTPATIENT)
Dept: PHYSICAL THERAPY | Facility: CLINIC | Age: 34
End: 2024-09-23
Payer: COMMERCIAL

## 2024-09-27 ENCOUNTER — APPOINTMENT (OUTPATIENT)
Dept: ORTHOPEDIC SURGERY | Facility: CLINIC | Age: 34
End: 2024-09-27
Payer: COMMERCIAL

## 2024-09-27 ENCOUNTER — APPOINTMENT (OUTPATIENT)
Dept: PHYSICAL THERAPY | Facility: CLINIC | Age: 34
End: 2024-09-27
Payer: COMMERCIAL

## 2024-09-27 DIAGNOSIS — S39.012A LUMBAR STRAIN, INITIAL ENCOUNTER: Primary | ICD-10-CM

## 2024-09-27 DIAGNOSIS — M67.952 TENDINOPATHY OF LEFT GLUTEUS MEDIUS: ICD-10-CM

## 2024-09-27 NOTE — PROGRESS NOTES
Established follow-up patient Visit    HPI: Romulo is a 34 y.o.male who presents today for follow-up of his left lumbar strain and left gluteus medius tendinopathy.  He states that overall he is feeling much better.  He states that over the past couple of weeks he feels like he is started to turn the corner.  He has been doing some physical therapy and home exercises.  He states that the Medrol Dosepak and cyclobenzaprine helped a little.  He denies any interval falls or injuries.  He does have some pain over the top of his right foot, that is most likely an overuse injury given his increased exercise.    On 8/14/2024, he presented with new complaints of low back pain.  He states that on Saturday he was trying to lift the garage door as the spring was broken, and he states that he felt immediate pain into his left lower back.  He states that it radiates to the lateral hip.  He denies any numbness and tingling.  He denies any bowel bladder changes, saddle anesthesia, no weakness in the legs.  States that the pain is throbbing and sharp.  He is having worsening pain with bending forward and sitting for long periods of time.  Has been using ibuprofen, muscle relaxer, ice, and saw the chiropractor 4 times.  He is not getting better.    Plan: Today, on 9/27/2024, we will have him continue with physical therapy and home exercises, continue to return return to his activities as tolerated, and follow-up as needed.  If he continues to have any issues with his right foot, I'd be happy to see him for that.    On 8/14/2024, for this left lumbar strain and left gluteus medius tendinopathy, we will start a Medrol Dosepak, him on cyclobenzaprine, have him start physical therapy and home exercises, continue other conservative treatment measures as discussed including ice, heat, stretches, and rest, and follow-up in 5 to 6 weeks.    Assessment:   Problem List Items Addressed This Visit    None  Visit Diagnoses       Lumbar strain,  initial encounter    -  Primary    Tendinopathy of left gluteus medius                  Diagnostics: X-rays of the lumbar spine reviewed and reveal no obvious acute fracture or subluxation.      Procedure:  Procedures    Physical Exam:  GENERAL:  No obvious acute distress.  NEURO:  Distally neurovascularly intact.  Sensation intact to light touch.  Extremity: Exam of the lumbar spine:  No tenderness along the midline of the lumbar and sacral spine;  No step-offs along the midline of the lumbar and sacral spine;  Flexion is full with no pain, but his hamstrings still are somewhat tight;  Extension is full with no pain;  Sidebending is full with no pain;  Rotation is full with no pain;  Negative straight leg raise on the right;  Negative straight leg raise on the left;  Full strength and range of motion throughout the bilateral lower extremities; and  Nonantalgic gait.    No orders of the defined types were placed in this encounter.     At the conclusion of the visit there were no further questions by the patient/family regarding their plan of care.  Patient was instructed to call or return with any issues, questions, or concerns regarding their injury and/or treatment plan described above.     09/27/24 at 3:28 PM - Abdelrahman Aguilera,     Office: [phone]    This note was prepared using voice recognition software.  The details of this note are correct and have been reviewed, and corrected to the best of my ability.  Some grammatical errors may persist related to the Dragon software.

## 2024-09-30 ENCOUNTER — APPOINTMENT (OUTPATIENT)
Dept: PHYSICAL THERAPY | Facility: CLINIC | Age: 34
End: 2024-09-30
Payer: COMMERCIAL

## 2024-09-30 DIAGNOSIS — S39.012D LUMBAR SPINE STRAIN, SUBSEQUENT ENCOUNTER: ICD-10-CM

## 2024-09-30 DIAGNOSIS — M54.50 LUMBAGO: Primary | ICD-10-CM

## 2024-09-30 PROCEDURE — 97140 MANUAL THERAPY 1/> REGIONS: CPT | Performed by: PHYSICAL THERAPIST

## 2024-09-30 PROCEDURE — 97110 THERAPEUTIC EXERCISES: CPT | Performed by: PHYSICAL THERAPIST

## 2024-09-30 ASSESSMENT — PAIN SCALES - GENERAL: PAINLEVEL_OUTOF10: 4

## 2024-09-30 NOTE — PROGRESS NOTES
"Physical Therapy    Physical Therapy Treatment    Patient Name: Romulo Sapp  MRN: [id number]  Today's Date: 9/30/2024    Current Problem  1. Lumbago        2. Lumbar spine strain, subsequent encounter          General  Reason for Referral: (P) Low back pain  Referred By: RodneyP) Abdelrahman Aguilera DO    Insurance: MMO  Allowed visits: 10    Subjective  Patient with improvement in lumbar spine symptoms. Some increased pain at right area of L5 that he attributes to difficulty with setting dry needle last visit though overall has had relief with dry needling procedures    Objective  Reviewed and progressed marked therapeutic exercise per patient tolerance (56305 - 27 minutes, 2 units) Child's pose 30\"x3  *Cat/camel x20  LTR x20  DKTC 30\"x3  SKTC isometrics 5\"x10 ea  Hooklying clamshells BuTB 2x15  *Anti-rotation step outs GTB x10 ea  Seated lumbar flexion 30\"x3    *added to HEP    MT (36652 - 18 minutes, 1 units) Dry needling performed this date using 4 2\"/50 mm needles to L2 paraspinal point as well as 5 3\"/75 mm needles to inferior gluteal homeostatic points, superior cluneals, and greater trochanter; 7 needles used in total. Informed consent obtained at [facility] and is on file in patient's chart. [place] precautions and clean technique utilized throughout. Skin was inspected for any adverse reactions and none were present. Patient was educated to avoid NSAIDs and use of ice for the next 24 hours to allow for maximum healing potential.   Grade III PA glides to L3-L5 for mobility and pain modulation; unilateral glides to right side of lumbar spine for left rotation mobility per patient tolerance.     Assessment  Patient with good tolerance to exercises added. Advised phasing in his regular workouts in tandem with HEP to see how he tolerates but to start slow and with lighter resistance and to back off if he experiences an increase in pain. Re-evaluation scheduled for next visit.     Plan  Continue per POC at this time.    "

## 2024-10-02 ENCOUNTER — TREATMENT (OUTPATIENT)
Dept: PHYSICAL THERAPY | Facility: CLINIC | Age: 34
End: 2024-10-02
Payer: COMMERCIAL

## 2024-10-02 DIAGNOSIS — M54.50 LUMBAGO: Primary | ICD-10-CM

## 2024-10-02 DIAGNOSIS — S39.012D LUMBAR SPINE STRAIN, SUBSEQUENT ENCOUNTER: ICD-10-CM

## 2024-10-02 PROCEDURE — 97140 MANUAL THERAPY 1/> REGIONS: CPT | Performed by: PHYSICAL THERAPIST

## 2024-10-02 PROCEDURE — 97110 THERAPEUTIC EXERCISES: CPT | Performed by: PHYSICAL THERAPIST

## 2024-10-02 ASSESSMENT — PAIN SCALES - GENERAL: PAINLEVEL_OUTOF10: 0 - NO PAIN

## 2024-10-02 NOTE — PROGRESS NOTES
"Physical Therapy    Physical Therapy Treatment    Patient Name: Romulo Sapp  MRN: [id number]  Today's Date: 10/2/2024    Current Problem  1. Lumbago        2. Lumbar spine strain, subsequent encounter          General  Reason for Referral: (P) Low back pain  Referred By: (P) Abdelrahman Aguilera DO    Insurance: MMO  Allowed visits: 4/10    Subjective  Patient with no pain currently. He has been able to perform his HEP in addition his own home workouts without much of an issue. He was able to work today and notes \"I hardly thought about it\".     Objective  Reviewed and progressed marked therapeutic exercise per patient tolerance (96630 - 27 minutes, 2 units) Child's pose 30\"x3  *Child's pose to left 30\"x3  Cat/camel x20  *Bird dog x20  LTR x20  DKTC 30\"x3  SKTC isometrics 5\"x12 ea  Hooklying clamshells BkTB 2x10  *Anti-rotation step outs with press out BuTB x10 ea  Seated lumbar flexion 30\"x3     *added to HEP     MT (78273 - 8 minutes, 1 unit) Grade III PA glides to L3-L5 for mobility and pain modulation; unilateral glides to right side of lumbar spine for left rotation mobility per patient tolerance.     Assessment  Held dry needling today to see how his symptoms respond. Tolerated upgrades well. Some limitation in lumbar and hip extension with bird dog, left LE more so than right. Encouraged continued compliance with HEP.     Plan  Continue per POC at this time.    "

## 2024-10-08 ENCOUNTER — APPOINTMENT (OUTPATIENT)
Dept: PHYSICAL THERAPY | Facility: CLINIC | Age: 34
End: 2024-10-08
Payer: COMMERCIAL

## 2024-10-18 NOTE — PROGRESS NOTES
"Physical Therapy    Physical Therapy Re-Evaluation    Patient Name: Romulo Sapp  MRN: [id number]  Today's Date: 10/21/2024    Current Problem  1. Lumbago        2. Lumbar spine strain, subsequent encounter          General  Reason for Referral: Low back pain  Referred By: Abdelrahman Aguilera DO    Insurance: MMO  Allowed visits: 5/10    Subjective  Patient with ability to work out this morning stating \"I'm easing back into it\". Chief complaint is \"it takes a long time to loosen up\". He does have some stiffness in AM. Biggest limitation is \"I haven't tried doing any kind of cardio yet\" which includes going for a run around his neighborhood. He does sleep on a softer mattress and notes that his mattress is fairly new. He is sleeping well. He denies paresthesias into his LE.     Objective  Worst pain in the last 24 hours, 4 improved from 6/10     Precautions: none     Observation: increased lumbar lordosis evident.      Gait Assessment: ambulated into clinic without assistive device with no significant gait deviation     AROM  Lumbar flexion: decreased 50%; improved reversal of lordosis  Lumbar extension: decreased 25% P! Improved from 50%  Lumbar sidebending right: decreased 25%    Sidebending left: decreased 25% improved from 50%   Lumbar rotation right: WNL, improved from decreased 25%    Rotation left: WNL, improved from decreased 50%   Hip flexion right: WNL    Hip flexion left: WNL  Hip extension right: WNL, improved from slightly limited    Hip extension left: WNL, improved from slightly limited  Hip ER supine right: WNL    Hip ER supine left: WNL     MMT:  (L3-L4) Right quadriceps: 5    Left quadriceps: 5  (L1-L2) Right iliopsoas: 5    Left iliopsoas: 5  (S2) Right hip abductors supine: good, improved from fair    Left hip abductors supine: good, improved from fair  Lower abdominals: 6/10, improved from unable to achieve test position P!   Upper abdominals: 10 improved from  5/10 P!    " "  Flexibility:  Right hamstring: WNL, improved from slightly limited   Left hamstring: WNL, improved from limited  Right quadriceps: slightly limited    Left quadriceps: slightly limited     Mobility: decreased throughout lumbar spine; pain elicited with PA glides at left side of L3-L5.      Special Tests: slump negative      MARCIN: 24%, improved from 58%    Re-evaluation performed on this date with new objective measurements obtained as above. See updated goals below.    Reviewed and progressed marked therapeutic exercise per patient tolerance (56492 - 27 minutes, 2 units) Child's pose 30\"x3  Child's pose to left 30\"x3  Cat/camel x20  Bird dog x20  DKTC 30\"x3  SKTC isometrics 5\"x15 ea  Anti-rotation step outs with press out BkTB x10 ea  Seated lumbar flexion 30\"x3     MT (98731 - 8 minutes, 1 unit) Grade III PA glides to L3-L5 for mobility and pain modulation; unilateral glides to right side of lumbar spine for left rotation mobility per patient tolerance.     Assessment  Patient with improvement in low back symptoms, AROM, strength, and overall function. He has begun to return to his previous workout level though does have some soreness and stiffness persisting. Discussed importance of continued compliance with HEP as well as possibility of recurring episodes of low back pain that he can likely manage with continuing to exercise. He would benefit from incorporating his cardio workouts provided they do not increase pain symptoms. Recommending discharge from this episode of care.     Plan  No further visits planned at this time.     Goals  Independent with HEP to expedite progress and promote goal achievement - met  Decrease MARCIN score to < or equal to 20% for evidence of improved function - partially met   Increase AROM lumbar spine flexion, extension, left SB, and left rotation by > or equal to 25% for improved ability to reach and bend - partially met   Increase strength upper and lower abdominals to > or equal to " 8/10 and bilateral hip abductors to 'good' for evidence of normal strength and tolerance to prolonged positions - partially met  Decrease pain at worst with activity to < or equal to 2/10 for improved QOL and confidence in functional ability - partially met

## 2024-10-21 ENCOUNTER — APPOINTMENT (OUTPATIENT)
Dept: PHYSICAL THERAPY | Facility: CLINIC | Age: 34
End: 2024-10-21
Payer: COMMERCIAL

## 2024-10-21 DIAGNOSIS — M54.50 LUMBAGO: Primary | ICD-10-CM

## 2024-10-21 DIAGNOSIS — S39.012D LUMBAR SPINE STRAIN, SUBSEQUENT ENCOUNTER: ICD-10-CM

## 2024-10-21 PROCEDURE — 97140 MANUAL THERAPY 1/> REGIONS: CPT | Performed by: PHYSICAL THERAPIST

## 2024-10-21 PROCEDURE — 97110 THERAPEUTIC EXERCISES: CPT | Performed by: PHYSICAL THERAPIST

## 2024-10-21 ASSESSMENT — PAIN SCALES - GENERAL: PAINLEVEL_OUTOF10: 4

## 2025-04-10 ENCOUNTER — HOSPITAL ENCOUNTER (OUTPATIENT)
Dept: RADIOLOGY | Facility: HOSPITAL | Age: 35
Discharge: HOME | End: 2025-04-10
Payer: COMMERCIAL

## 2025-04-10 ENCOUNTER — APPOINTMENT (OUTPATIENT)
Dept: PRIMARY CARE | Facility: CLINIC | Age: 35
End: 2025-04-10
Payer: COMMERCIAL

## 2025-04-10 VITALS
SYSTOLIC BLOOD PRESSURE: 110 MMHG | TEMPERATURE: 97.6 F | OXYGEN SATURATION: 99 % | WEIGHT: 224.2 LBS | BODY MASS INDEX: 27.88 KG/M2 | HEIGHT: 75 IN | HEART RATE: 97 BPM | DIASTOLIC BLOOD PRESSURE: 68 MMHG

## 2025-04-10 DIAGNOSIS — S99.912A INJURY OF LEFT ANKLE, INITIAL ENCOUNTER: ICD-10-CM

## 2025-04-10 DIAGNOSIS — S93.492A SPRAIN OF ANTERIOR TALOFIBULAR LIGAMENT OF LEFT ANKLE, INITIAL ENCOUNTER: ICD-10-CM

## 2025-04-10 DIAGNOSIS — S99.912A INJURY OF LEFT ANKLE, INITIAL ENCOUNTER: Primary | ICD-10-CM

## 2025-04-10 PROCEDURE — 99214 OFFICE O/P EST MOD 30 MIN: CPT | Performed by: FAMILY MEDICINE

## 2025-04-10 PROCEDURE — 1036F TOBACCO NON-USER: CPT | Performed by: FAMILY MEDICINE

## 2025-04-10 PROCEDURE — 73610 X-RAY EXAM OF ANKLE: CPT | Mod: LT

## 2025-04-10 PROCEDURE — 73630 X-RAY EXAM OF FOOT: CPT | Mod: LT

## 2025-04-10 PROCEDURE — 3008F BODY MASS INDEX DOCD: CPT | Performed by: FAMILY MEDICINE

## 2025-04-10 RX ORDER — LISDEXAMFETAMINE DIMESYLATE 50 MG/1
50 CAPSULE ORAL
COMMUNITY
Start: 2024-06-03 | End: 2025-04-10 | Stop reason: WASHOUT

## 2025-04-10 RX ORDER — DICLOFENAC SODIUM 75 MG/1
75 TABLET, DELAYED RELEASE ORAL 2 TIMES DAILY
Qty: 20 TABLET | Refills: 1 | Status: SHIPPED | OUTPATIENT
Start: 2025-04-10 | End: 2025-04-20

## 2025-04-10 RX ORDER — TRAZODONE HYDROCHLORIDE 50 MG/1
TABLET ORAL
COMMUNITY
Start: 2025-03-28

## 2025-04-10 RX ORDER — DOXEPIN 3 MG/1
TABLET, FILM COATED ORAL
COMMUNITY
Start: 2025-02-14 | End: 2025-04-10 | Stop reason: WASHOUT

## 2025-04-10 ASSESSMENT — PATIENT HEALTH QUESTIONNAIRE - PHQ9
1. LITTLE INTEREST OR PLEASURE IN DOING THINGS: NOT AT ALL
2. FEELING DOWN, DEPRESSED OR HOPELESS: NOT AT ALL
SUM OF ALL RESPONSES TO PHQ9 QUESTIONS 1 AND 2: 0
2. FEELING DOWN, DEPRESSED OR HOPELESS: NOT AT ALL
SUM OF ALL RESPONSES TO PHQ9 QUESTIONS 1 AND 2: 0
1. LITTLE INTEREST OR PLEASURE IN DOING THINGS: NOT AT ALL

## 2025-04-10 ASSESSMENT — ENCOUNTER SYMPTOMS
DEPRESSION: 0
LOSS OF SENSATION IN FEET: 0
OCCASIONAL FEELINGS OF UNSTEADINESS: 0

## 2025-04-10 NOTE — PROGRESS NOTES
Subjective   Chief complaint: Romulo Sapp is a 34 y.o. male who presents for Annual Exam and Ankle Injury (Left ankle came down on opponent foot and rolled during basketball game. Bruising and swelling to left ankle).    HPI:  HPI  Annual wellness.  Blood pressure is normal.  He already had labs done through work.  Can you review those.  He is already up-to-date on colonoscopy.  r.  No family history of prostate cancer.  Stays physically active.  Eats a healthy diet.  Discussed activity modification.  Physical activity thyroid healthy diet medication compliance.  Safety measures reinforced avoidance of tobacco excessive alcohol or the substances encouraged.  Advance directive discussed.  Vaccinations reviewed.    Unrelated to the annual wellness he fell.  Playing basketball.  Last night.  Sprained his ankle.  Left ankle.  He has significant soft tissue swelling noted laterally.  He is unable to bear weight.  He was unable to bear weight at the time of the injury and subsequent and now has difficulty bearing weight.  Use crutches yesterday and at work today.  Now is wearing a cam walker boot provided by will his work.  He has tender to palpation anterior talofibular ligament.  He has some laxity but a good endpoint on anterior drawer.  He has tenderness to palpation over the lateral as well as medial malleoli.  No tenderness to palpation of the deltoid ligament.  No pain with deltoid ligament stress.  No tenderness to palpation over the tarsal navicular or the base of the fifth metatarsal.  Discussed indications for x-ray.  Order provided.  Protected range of motion.  Protection.  Rest.  Ice.  Compression can.  Elevation.  Anti-inflammatory medical therapy.  Rule out fracture.  Discussed treatment for ankle sprain injury with rehab if his x-ray is negative for fracture.  He already has materials and exercises at home.  Objective   /68 (BP Location: Right arm, Patient Position: Sitting)   Pulse 97   Temp  "36.4 °C (97.6 °F) (Temporal)   Ht 1.905 m (6' 3\")   Wt 102 kg (224 lb 3.2 oz)   SpO2 99% Comment: ra  BMI 28.02 kg/m²   Physical Exam  Review of Systems   I have reviewed and reconciled the medication list with the patient today.   Current Outpatient Medications:     amphetamine-dextroamphetamine (Adderall) 10 mg tablet, Take 1 tablet (10 mg) by mouth once daily., Disp: , Rfl:     amphetamine-dextroamphetamine XR (Adderall XR) 30 mg 24 hr capsule, Take 1 capsule (30 mg) by mouth once daily in the morning. Take before meals., Disp: , Rfl:     EPINEPHrine 0.3 mg/0.3 mL injection syringe, Inject 0.3 mL (0.3 mg) into the muscle 1 time if needed for anaphylaxis., Disp: , Rfl:     traZODone (Desyrel) 50 mg tablet, Take 1-2 tablet by mouth at bedtime as needed for insomnia. STOP Doxepin., Disp: , Rfl:     diclofenac (Voltaren) 75 mg EC tablet, Take 1 tablet (75 mg) by mouth 2 times a day for 10 days. Do not crush, chew, or split., Disp: 20 tablet, Rfl: 1     Imaging:  Imaging  No results found.    Cardiology, Vascular, and Other Imaging  No other imaging results found for the past 7 days       Labs reviewed:    Lab Results   Component Value Date    WBC 5.9 07/26/2023    HGB 15.2 07/26/2023    HCT 44.6 07/26/2023     07/26/2023    ALT 35 07/26/2023    AST 49 (H) 07/26/2023     07/26/2023    K 3.9 07/26/2023     07/26/2023    CREATININE 1.37 (H) 07/26/2023    BUN 22 07/26/2023    CO2 29 07/26/2023       Assessment/Plan   Problem List Items Addressed This Visit    None  Visit Diagnoses         Codes    Injury of left ankle, initial encounter    -  Primary S99.916N    Relevant Medications    diclofenac (Voltaren) 75 mg EC tablet    Other Relevant Orders    XR ankle left 3+ views    XR foot left 3+ views    Sprain of anterior talofibular ligament of left ankle, initial encounter     S93.492A            Reinforced lifestyle modifications  Continue current medications as listed  Physical activity as " tolerated and healthy diet encouraged  Maintain a healthy weight  Follow up in

## 2025-04-11 ENCOUNTER — TELEPHONE (OUTPATIENT)
Dept: PRIMARY CARE | Facility: CLINIC | Age: 35
End: 2025-04-11
Payer: COMMERCIAL

## 2025-04-11 NOTE — TELEPHONE ENCOUNTER
Patient called the office today wanting to know what the results are to his ankle xray, please advise

## 2025-06-26 ENCOUNTER — HOSPITAL ENCOUNTER (OUTPATIENT)
Dept: RADIOLOGY | Facility: CLINIC | Age: 35
Discharge: HOME | End: 2025-06-26
Payer: COMMERCIAL

## 2025-06-26 ENCOUNTER — APPOINTMENT (OUTPATIENT)
Dept: ORTHOPEDIC SURGERY | Facility: CLINIC | Age: 35
End: 2025-06-26
Payer: COMMERCIAL

## 2025-06-26 DIAGNOSIS — M25.373 CHRONIC INSTABILITY OF ANKLE: ICD-10-CM

## 2025-06-26 DIAGNOSIS — S93.402D SPRAIN OF LEFT ANKLE, UNSPECIFIED LIGAMENT, SUBSEQUENT ENCOUNTER: ICD-10-CM

## 2025-06-26 PROCEDURE — 73610 X-RAY EXAM OF ANKLE: CPT | Mod: LT

## 2025-06-26 PROCEDURE — 73610 X-RAY EXAM OF ANKLE: CPT | Mod: LEFT SIDE | Performed by: FAMILY MEDICINE

## 2025-06-26 PROCEDURE — 99212 OFFICE O/P EST SF 10 MIN: CPT | Performed by: FAMILY MEDICINE

## 2025-06-26 RX ORDER — METHYLPREDNISOLONE 4 MG/1
TABLET ORAL
Qty: 21 TABLET | Refills: 0 | Status: SHIPPED | OUTPATIENT
Start: 2025-06-26

## 2025-06-26 NOTE — PROGRESS NOTES
Follow-Up Patient Visit: Lower Extremity Injury  Assessment & Plan  Sprain of left ankle  Re-aggravation of prior sprain with persistent tenderness, soreness, and intermittent swelling. X-rays normal. MRI needed to assess for cartilage injury, tendonitis, or ligament impingement.  - Prescribed Medrol Dosepak for inflammation and swelling.  - Ordered MRI of the left ankle.  - Encouraged use of ankle brace or sleeve for support and swelling control.  - Continue prescribed exercises.    Left ankle effusion  Persistent small joint effusion possibly due to soft tissue injury or inflammation. MRI needed to identify loose bodies or soft tissue injuries.  - Prescribed Medrol Dosepak for joint effusion.  - Ordered MRI of the left ankle.    Recurrent left ankle instability  Recurrent instability with activities, no significant laxity on exam. MRI needed to assess for ligament impingement or tendonitis.  - Ordered MRI of the left ankle.  - Encouraged use of ankle brace or sleeve for support during activities.    Orders Placed This Encounter    XR ankle left 3+ views    MR ankle left wo IV contrast    methylPREDNISolone (Medrol Dospak) 4 mg tablets     1. Sprain of left ankle, unspecified ligament, subsequent encounter    2. Chronic instability of ankle      Procedures  At the conclusion of the visit there were no further questions by the patient/family regarding their plan of care.  Patient was instructed to call or return with any issues, questions, or concerns regarding their injury and/or treatment plan described above.    PHYSICAL EXAM:  Neuro: Gross sensation intact to the lower extremities bilaterally.  Lower extremity: Left ankle exam:  Physical Exam  EXTREMITIES: Left ankle with soft tissue swelling and small joint effusion. Pain over distal fibula and medial malleolus. Soft tissue swelling and fullness anteriorly. No laxity with anterior drawer test. No open cuts, wounds, or sores. Negative heel tap and calcaneus  squeeze test. Full plantar flexion, dorsiflexion, eversion, and inversion. No pain across midfoot or distal metatarsals. Calf is soft and non-tender.    IMAGING:   Results  RADIOLOGY  Left ankle x-ray: Normal, no fractures or abnormalities  XR ankle left 3+ views  Narrative: Interpreted By:  Budinsky, Cole,   STUDY:  XR ANKLE LEFT 3+ VIEWS; ;  6/26/2025 5:06 pm      INDICATION:  Signs/Symptoms:pain.      ACCESSION NUMBER(S):  [id number]      ORDERING CLINICIAN:  COLE BUDINSKY      Impression: Three views left ankle demonstrate no presence for fracture or  dislocation. Minimal soft tissue swelling is noted. Ankle joint  mortise intact and preserved. Question small calcification off the  lateral aspect of the medial shoulder of the talus may be consistent  with posttraumatic changes. Does appear to be more prominent and  sclerotic when compared to prior available AP images.          Signed by: Cole Budinsky 6/26/2025 7:24 PM  Dictation workstation:   [id number]       HPI  Patient ID: Romulo Sapp is a 34 y.o. male who presents for Pain of the Left Ankle (H/o sprain/Xrays today).  History of Present Illness  Romulo Sapp is a 34 year old male who presents with reaggravation of a prior left ankle injury.    He initially sustained the left ankle injury while playing basketball on April 7th, approximately two and a half months ago. The injury resulted in persistent tenderness and soreness, with swelling that has been difficult to manage.    Pain is primarily located on the medial aspect of the ankle, described as similar to 'putting your hand on a bruise.' Lateral ankle pain occurs with certain activities. Swelling is intermittent, exacerbated by prolonged standing. He has used a brace and a boot but continues to experience discomfort, especially when pushing off the foot.    No pain across the anterior ankle, heel, midfoot, or distant metatarsals. The pain is not constant but is noticeable with activity. He  has full range of motion in plantar flexion, dorsiflexion, eversion, and inversion without pain.    He was initially prescribed diclofenac, an anti-inflammatory medication, following the injury. Despite adequate rest, treatment, and rehabilitation, swelling and pain have persisted.            This medical note was created with the assistance of artificial intelligence (AI) for documentation purposes. The content has been reviewed and confirmed by the healthcare provider for accuracy and completeness. Patient consented to the use of audio recording and use of AI during their visit.     06/27/25 at 5:31 PM - Cole C Budinsky, MD  Office:  [phone]

## 2025-07-07 ENCOUNTER — HOSPITAL ENCOUNTER (OUTPATIENT)
Dept: RADIOLOGY | Facility: HOSPITAL | Age: 35
Discharge: HOME | End: 2025-07-07
Payer: COMMERCIAL

## 2025-07-07 ENCOUNTER — HOSPITAL ENCOUNTER (OUTPATIENT)
Dept: RADIOLOGY | Facility: CLINIC | Age: 35
End: 2025-07-07
Payer: COMMERCIAL

## 2025-07-07 DIAGNOSIS — S93.402D SPRAIN OF LEFT ANKLE, UNSPECIFIED LIGAMENT, SUBSEQUENT ENCOUNTER: ICD-10-CM

## 2025-07-07 DIAGNOSIS — M25.373 CHRONIC INSTABILITY OF ANKLE: ICD-10-CM

## 2025-07-07 PROCEDURE — 73721 MRI JNT OF LWR EXTRE W/O DYE: CPT | Mod: LT

## 2025-07-07 PROCEDURE — 73721 MRI JNT OF LWR EXTRE W/O DYE: CPT | Mod: LEFT SIDE | Performed by: STUDENT IN AN ORGANIZED HEALTH CARE EDUCATION/TRAINING PROGRAM
